# Patient Record
Sex: MALE | Race: OTHER | NOT HISPANIC OR LATINO
[De-identification: names, ages, dates, MRNs, and addresses within clinical notes are randomized per-mention and may not be internally consistent; named-entity substitution may affect disease eponyms.]

---

## 2019-11-27 PROBLEM — 8114009 DIVERTICULOSIS OF SMALL INTESTINE: Status: ACTIVE | Noted: 2019-11-27

## 2019-11-27 PROBLEM — 196735001 CSG - CHRONIC SUPERFICIAL GASTRITIS: Status: ACTIVE | Noted: 2019-11-27

## 2019-11-27 PROBLEM — 266433003 GASTRO-ESOPHAGEAL REFLUX DISEASE WITH ESOPHAGITIS: Status: ACTIVE | Noted: 2019-11-27

## 2021-06-08 ENCOUNTER — DASHBOARD ENCOUNTERS (OUTPATIENT)
Age: 36
End: 2021-06-08

## 2021-06-08 ENCOUNTER — OFFICE VISIT (OUTPATIENT)
Dept: URBAN - METROPOLITAN AREA CLINIC 12 | Facility: CLINIC | Age: 36
End: 2021-06-08
Payer: COMMERCIAL

## 2021-06-08 VITALS
HEIGHT: 69 IN | TEMPERATURE: 97.1 F | HEART RATE: 73 BPM | WEIGHT: 157 LBS | DIASTOLIC BLOOD PRESSURE: 87 MMHG | SYSTOLIC BLOOD PRESSURE: 124 MMHG | BODY MASS INDEX: 23.25 KG/M2

## 2021-06-08 DIAGNOSIS — R79.89 ABNORMAL LIVER FUNCTION TEST: ICD-10-CM

## 2021-06-08 DIAGNOSIS — R94.5 ABNORMAL LFTS: ICD-10-CM

## 2021-06-08 PROCEDURE — 99244 OFF/OP CNSLTJ NEW/EST MOD 40: CPT | Performed by: INTERNAL MEDICINE

## 2021-06-08 NOTE — HPI-TODAY'S VISIT:
35M ref by Dr Yin Tristan for GI eval for abnormal LFTs. A copy of this document will be sent to the referring physician. he says LFTs have been abnormal for the past 2 years Labs 6/21--elevated lipids. normal CBC, AlkPhos 148, [id number], ALT46, TB0.3, TSH WNL drinks alcohol once a month he says he had egd done 2 yrs ago which showed ulcers. he was on pantoprazole for few months.

## 2021-07-09 ENCOUNTER — TELEPHONE ENCOUNTER (OUTPATIENT)
Dept: URBAN - METROPOLITAN AREA CLINIC 23 | Facility: CLINIC | Age: 36
End: 2021-07-09

## 2025-04-11 ENCOUNTER — LAB OUTSIDE AN ENCOUNTER (OUTPATIENT)
Dept: URBAN - METROPOLITAN AREA CLINIC 12 | Facility: CLINIC | Age: 40
End: 2025-04-11

## 2025-04-11 ENCOUNTER — OFFICE VISIT (OUTPATIENT)
Dept: URBAN - METROPOLITAN AREA CLINIC 12 | Facility: CLINIC | Age: 40
End: 2025-04-11
Payer: COMMERCIAL

## 2025-04-11 DIAGNOSIS — K76.0 HEPATIC STEATOSIS: ICD-10-CM

## 2025-04-11 DIAGNOSIS — Z86.19 HISTORY OF HELICOBACTER PYLORI INFECTION: ICD-10-CM

## 2025-04-11 DIAGNOSIS — R79.89 ABNORMAL LFTS: ICD-10-CM

## 2025-04-11 DIAGNOSIS — R13.19 ESOPHAGEAL DYSPHAGIA: ICD-10-CM

## 2025-04-11 PROBLEM — 40890009: Status: ACTIVE | Noted: 2025-04-11

## 2025-04-11 PROBLEM — 15627741000119108: Status: ACTIVE | Noted: 2025-04-11

## 2025-04-11 PROBLEM — 197321007: Status: ACTIVE | Noted: 2025-04-11

## 2025-04-11 PROBLEM — 166603001: Status: ACTIVE | Noted: 2025-04-11

## 2025-04-11 PROCEDURE — 99204 OFFICE O/P NEW MOD 45 MIN: CPT

## 2025-04-11 RX ORDER — PANTOPRAZOLE SODIUM 40 MG/1
1 TABLET TABLET, DELAYED RELEASE ORAL ONCE A DAY
Qty: 90 TABLET | Refills: 3 | OUTPATIENT
Start: 2025-04-11

## 2025-04-11 NOTE — HPI-TODAY'S VISIT:
Patient is a 39-year-old male presents today for dysphagia. He was last seen in the office by Dr. Emmanuel in 2021 for abnormal LFTs.  No significant abnormality seen on abdominal ultrasound. He was advised to get labs including acute hepatitis panel, and hepatic function panel and follow-up but did not follow-up.  Last EGD was in 2019 with  which showed d gastritis, diverticulosis of small intestine without perforation or abscess.  Pathology showed chronic mild gastritis, negative for HP or IM.  Reflux esophagitis.  Negative for celiac.  Today reports dysphagia for over a year.  States it usually occurs during lunchtime, with first few bites.  Feels it is worsening/frequent.  He reports history of HP gastritis a couple years ago, unable to recall if he was treated with antibiotics.  Denies acid reflux, heartburn, epigastric pain, n/v or melena.  No family history of esophageal or gastric CA.   He reports chronic elevation in his liver enzymes and known history of fatty liver.  He has history of HLD, not on any medication. Recent labs from 2/2025 showed elevated ALT at 49. Normal Alk phos, bili and AST. Drinks alcohol once every 2 weeks. No FH of liver disease.

## 2025-04-30 ENCOUNTER — OFFICE VISIT (OUTPATIENT)
Dept: URBAN - METROPOLITAN AREA CLINIC 97 | Facility: CLINIC | Age: 40
End: 2025-04-30

## 2025-04-30 RX ORDER — PANTOPRAZOLE SODIUM 40 MG/1
1 TABLET TABLET, DELAYED RELEASE ORAL ONCE A DAY
Qty: 90 TABLET | Refills: 3 | Status: ACTIVE | COMMUNITY
Start: 2025-04-11

## 2025-05-05 ENCOUNTER — CLAIMS CREATED FROM THE CLAIM WINDOW (OUTPATIENT)
Dept: URBAN - METROPOLITAN AREA SURGERY CENTER 15 | Facility: SURGERY CENTER | Age: 40
End: 2025-05-05
Payer: COMMERCIAL

## 2025-05-05 DIAGNOSIS — K29.30 CHRONIC SUPERFICIAL GASTRITIS WITHOUT BLEEDING: ICD-10-CM

## 2025-05-05 DIAGNOSIS — K21.00 CHRONIC REFLUX ESOPHAGITIS: ICD-10-CM

## 2025-05-05 DIAGNOSIS — R13.19 CERVICAL DYSPHAGIA: ICD-10-CM

## 2025-05-05 DIAGNOSIS — K29.60 OTHER GASTRITIS WITHOUT BLEEDING: ICD-10-CM

## 2025-05-05 DIAGNOSIS — K21.00 GASTRO-ESOPHAGEAL REFLUX DISEASE WITH ESOPHAGITIS, WITHOUT BLEEDING: ICD-10-CM

## 2025-05-05 PROCEDURE — 00731 ANES UPR GI NDSC PX NOS: CPT | Performed by: NURSE ANESTHETIST, CERTIFIED REGISTERED

## 2025-05-05 PROCEDURE — 43239 EGD BIOPSY SINGLE/MULTIPLE: CPT | Performed by: INTERNAL MEDICINE

## 2025-05-05 RX ORDER — PANTOPRAZOLE SODIUM 40 MG/1
1 TABLET TABLET, DELAYED RELEASE ORAL ONCE A DAY
Qty: 90 TABLET | Refills: 3 | Status: ACTIVE | COMMUNITY
Start: 2025-04-11

## 2025-05-16 ENCOUNTER — OFFICE VISIT (OUTPATIENT)
Dept: URBAN - METROPOLITAN AREA CLINIC 12 | Facility: CLINIC | Age: 40
End: 2025-05-16
Payer: COMMERCIAL

## 2025-05-16 DIAGNOSIS — R13.19 ESOPHAGEAL DYSPHAGIA: ICD-10-CM

## 2025-05-16 DIAGNOSIS — Z87.19 HISTORY OF GASTROESOPHAGEAL REFLUX (GERD): ICD-10-CM

## 2025-05-16 DIAGNOSIS — R79.89 ELEVATED LFTS: ICD-10-CM

## 2025-05-16 DIAGNOSIS — K76.0 HEPATIC STEATOSIS: ICD-10-CM

## 2025-05-16 PROBLEM — 70861000119106: Status: ACTIVE | Noted: 2025-05-16

## 2025-05-16 PROCEDURE — 99214 OFFICE O/P EST MOD 30 MIN: CPT

## 2025-05-16 RX ORDER — PANTOPRAZOLE SODIUM 40 MG/1
1 TABLET TABLET, DELAYED RELEASE ORAL ONCE A DAY
Qty: 90 TABLET | Refills: 3 | Status: ACTIVE | COMMUNITY
Start: 2025-04-11

## 2025-05-16 RX ORDER — PANTOPRAZOLE SODIUM 20 MG/1
1 TABLET 1/2 TO 1 HOUR BEFORE MORNING MEAL TABLET, DELAYED RELEASE ORAL ONCE A DAY
Qty: 90 | Refills: 0 | OUTPATIENT
Start: 2025-05-16

## 2025-05-16 NOTE — HPI-TODAY'S VISIT:
Patient was seen by me on 4/11/25 for chronic dysphagia and hepatic steatosis.  Fibroscan: 0, F0. CAP: 226, E: 4.3 kPa   EGD on 5/5/2025 with Dr. Emmanuel showed LA grade A reflux esophagitis.  Pathology showed reflux esophagitis, negative for IM.  Mild chronic gastritis, negative for HP or IM.   Today pt reports ~ 80-90% improvement in his symptoms with daily Pantoprazole 40mg. Denies further episodes of dysphagia. Denies dysphagia or GERD symptoms today. He has hx of chronically elevated LFTs. Most recent labs from 2/2025 showed elevated ALT at 49, with normal AST, bili, Alk phos and Plt. He has hx of HLD. Denies any new concerns or symptoms today.    Last OV note from 4/11/25 Patient is a 39-year-old male presents today for dysphagia. He was last seen in the office by Dr. Emmanuel in 2021 for abnormal LFTs.  No significant abnormality seen on abdominal ultrasound. He was advised to get labs including acute hepatitis panel, and hepatic function panel and follow-up but did not follow-up.  Last EGD was in 2019 with  which showed gastritis, diverticulosis of small intestine without perforation or abscess.  Pathology showed chronic mild gastritis, negative for HP or IM.  Reflux esophagitis.  Negative for celiac.  Today reports dysphagia for over a year.  States it usually occurs during lunchtime, with first few bites.  Feels it is worsening/frequent.  He reports history of HP gastritis a couple years ago, unable to recall if he was treated with antibiotics.  Denies acid reflux, heartburn, epigastric pain, n/v or melena.  No family history of esophageal or gastric CA.   He reports chronic elevation in his liver enzymes and known history of fatty liver.  He has history of HLD, not on any medication. Recent labs from 2/2025 showed elevated ALT at 49. Normal Alk phos, bili and AST. Drinks alcohol once every 2 weeks. No FH of liver disease.